# Patient Record
Sex: FEMALE | Race: WHITE | NOT HISPANIC OR LATINO | URBAN - METROPOLITAN AREA
[De-identification: names, ages, dates, MRNs, and addresses within clinical notes are randomized per-mention and may not be internally consistent; named-entity substitution may affect disease eponyms.]

---

## 2018-07-27 ENCOUNTER — HOSPITAL ENCOUNTER (EMERGENCY)
Facility: HOSPITAL | Age: 73
Discharge: HOME | End: 2018-07-27
Attending: FAMILY MEDICINE
Payer: MEDICARE

## 2018-07-27 VITALS
RESPIRATION RATE: 18 BRPM | OXYGEN SATURATION: 98 % | TEMPERATURE: 98 F | BODY MASS INDEX: 17.33 KG/M2 | SYSTOLIC BLOOD PRESSURE: 139 MMHG | WEIGHT: 104 LBS | HEIGHT: 65 IN | HEART RATE: 90 BPM | DIASTOLIC BLOOD PRESSURE: 75 MMHG

## 2018-07-27 DIAGNOSIS — N39.0 URINARY TRACT INFECTION WITHOUT HEMATURIA, SITE UNSPECIFIED: Primary | ICD-10-CM

## 2018-07-27 LAB
ALBUMIN SERPL BCP-MCNC: 4 G/DL
ALP SERPL-CCNC: 78 U/L
ALT SERPL W/O P-5'-P-CCNC: 25 U/L
ANION GAP SERPL CALC-SCNC: 8 MMOL/L
AST SERPL-CCNC: 27 U/L
BACTERIA #/AREA URNS HPF: ABNORMAL /HPF
BASOPHILS # BLD AUTO: 0.03 K/UL
BASOPHILS NFR BLD: 0.4 %
BILIRUB SERPL-MCNC: 0.1 MG/DL
BILIRUB UR QL STRIP: NEGATIVE
BUN SERPL-MCNC: 20 MG/DL
CALCIUM SERPL-MCNC: 9.7 MG/DL
CHLORIDE SERPL-SCNC: 101 MMOL/L
CLARITY UR: CLEAR
CO2 SERPL-SCNC: 29 MMOL/L
COLOR UR: YELLOW
CREAT SERPL-MCNC: 0.9 MG/DL
DIFFERENTIAL METHOD: NORMAL
EOSINOPHIL # BLD AUTO: 0.1 K/UL
EOSINOPHIL NFR BLD: 1.4 %
ERYTHROCYTE [DISTWIDTH] IN BLOOD BY AUTOMATED COUNT: 13.4 %
EST. GFR  (AFRICAN AMERICAN): >60 ML/MIN/1.73 M^2
EST. GFR  (NON AFRICAN AMERICAN): >60 ML/MIN/1.73 M^2
GLUCOSE SERPL-MCNC: 97 MG/DL
GLUCOSE UR QL STRIP: NEGATIVE
HCT VFR BLD AUTO: 39.2 %
HGB BLD-MCNC: 13.2 G/DL
HGB UR QL STRIP: NEGATIVE
HYALINE CASTS #/AREA URNS LPF: ABNORMAL /LPF
IMM GRANULOCYTES # BLD AUTO: 0.02 K/UL
IMM GRANULOCYTES NFR BLD AUTO: 0.2 %
KETONES UR QL STRIP: ABNORMAL
LEUKOCYTE ESTERASE UR QL STRIP: NEGATIVE
LYMPHOCYTES # BLD AUTO: 2.4 K/UL
LYMPHOCYTES NFR BLD: 28 %
MCH RBC QN AUTO: 31 PG
MCHC RBC AUTO-ENTMCNC: 33.7 G/DL
MCV RBC AUTO: 92 FL
MICROSCOPIC COMMENT: ABNORMAL
MONOCYTES # BLD AUTO: 0.7 K/UL
MONOCYTES NFR BLD: 8.3 %
NEUTROPHILS # BLD AUTO: 5.3 K/UL
NEUTROPHILS NFR BLD: 61.7 %
NITRITE UR QL STRIP: NEGATIVE
NRBC BLD-RTO: 0 /100 WBC
PH UR STRIP: 5 [PH] (ref 5–8)
PLATELET # BLD AUTO: 200 K/UL
PMV BLD AUTO: 10 FL
POTASSIUM SERPL-SCNC: 4.2 MMOL/L
PROT SERPL-MCNC: 7.2 G/DL
PROT UR QL STRIP: ABNORMAL
RBC # BLD AUTO: 4.26 M/UL
RBC #/AREA URNS HPF: 2 /HPF (ref 0–4)
SODIUM SERPL-SCNC: 138 MMOL/L
SP GR UR STRIP: >=1.03 (ref 1–1.03)
SQUAMOUS #/AREA URNS HPF: 6 /HPF
URN SPEC COLLECT METH UR: ABNORMAL
UROBILINOGEN UR STRIP-ACNC: NEGATIVE EU/DL
WBC # BLD AUTO: 8.57 K/UL
WBC #/AREA URNS HPF: 6 /HPF (ref 0–5)

## 2018-07-27 PROCEDURE — 74176 CT ABD & PELVIS W/O CONTRAST: CPT | Mod: TC

## 2018-07-27 PROCEDURE — 99284 EMERGENCY DEPT VISIT MOD MDM: CPT | Mod: 25

## 2018-07-27 PROCEDURE — 85025 COMPLETE CBC W/AUTO DIFF WBC: CPT

## 2018-07-27 PROCEDURE — 81000 URINALYSIS NONAUTO W/SCOPE: CPT

## 2018-07-27 PROCEDURE — 36415 COLL VENOUS BLD VENIPUNCTURE: CPT

## 2018-07-27 PROCEDURE — 80053 COMPREHEN METABOLIC PANEL: CPT

## 2018-07-27 PROCEDURE — 25000003 PHARM REV CODE 250: Performed by: FAMILY MEDICINE

## 2018-07-27 PROCEDURE — 74176 CT ABD & PELVIS W/O CONTRAST: CPT | Mod: 26,,, | Performed by: RADIOLOGY

## 2018-07-27 RX ORDER — NITROFURANTOIN (MACROCRYSTALS) 100 MG/1
100 CAPSULE ORAL EVERY 12 HOURS
Qty: 20 CAPSULE | Refills: 0 | Status: SHIPPED | OUTPATIENT
Start: 2018-07-27

## 2018-07-27 RX ORDER — OXYCODONE AND ACETAMINOPHEN 5; 325 MG/1; MG/1
1 TABLET ORAL
Status: COMPLETED | OUTPATIENT
Start: 2018-07-27 | End: 2018-07-27

## 2018-07-27 RX ORDER — NITROFURANTOIN 25; 75 MG/1; MG/1
100 CAPSULE ORAL
Status: COMPLETED | OUTPATIENT
Start: 2018-07-27 | End: 2018-07-27

## 2018-07-27 RX ORDER — OXYCODONE AND ACETAMINOPHEN 5; 325 MG/1; MG/1
1 TABLET ORAL EVERY 8 HOURS PRN
Qty: 12 TABLET | Refills: 0 | Status: SHIPPED | OUTPATIENT
Start: 2018-07-27

## 2018-07-27 RX ADMIN — OXYCODONE HYDROCHLORIDE AND ACETAMINOPHEN 1 TABLET: 5; 325 TABLET ORAL at 04:07

## 2018-07-27 RX ADMIN — NITROFURANTOIN (MONOHYDRATE/MACROCRYSTALS) 100 MG: 75; 25 CAPSULE ORAL at 04:07

## 2018-07-27 NOTE — ED PROVIDER NOTES
Encounter Date: 7/27/2018       History     Chief Complaint   Patient presents with    Flank Pain     rt sided pain x 4 days     73-year-old female presents complaining of pain to the right flank area she has had some urinary frequency she was treated for UTI approximately 2 weeks ago I thought it was over she denies any known history of urinary tract infections in the distant past, no history of nephrolithiasis, she denies any lifting prior problems with back pain but states she has been losing weight over the last 6 months and now weighs 110 lb, she does drink 1 Ensure daily as a supplement          Review of patient's allergies indicates:   Allergen Reactions    Codeine     Doxycycline      Past Medical History:   Diagnosis Date    COPD (chronic obstructive pulmonary disease)     GERD (gastroesophageal reflux disease)     Hypertension      Past Surgical History:   Procedure Laterality Date    APPENDECTOMY      BACK SURGERY      NECK SURGERY       History reviewed. No pertinent family history.  Social History   Substance Use Topics    Smoking status: Current Every Day Smoker     Packs/day: 0.50     Years: 55.00     Types: Cigarettes    Smokeless tobacco: Not on file    Alcohol use No     Review of Systems   Constitutional: Negative for fever.   HENT: Negative for sore throat.    Respiratory: Negative for shortness of breath.    Cardiovascular: Negative for chest pain.   Gastrointestinal: Negative for nausea.   Genitourinary: Negative for dysuria.   Musculoskeletal: Negative for back pain.   Skin: Negative for rash.   Neurological: Negative for weakness.   Hematological: Does not bruise/bleed easily.       Physical Exam     Initial Vitals   BP Pulse Resp Temp SpO2   07/27/18 1145 07/27/18 1145 07/27/18 1145 07/27/18 1147 07/27/18 1145   139/75 90 18 97.8 °F (36.6 °C) 98 %      MAP       --                Physical Exam    Nursing note and vitals reviewed.  Constitutional: She appears well-developed and  well-nourished. She is not diaphoretic. No distress.   HENT:   Head: Normocephalic and atraumatic.   Right Ear: External ear normal.   Left Ear: External ear normal.   Eyes: Pupils are equal, round, and reactive to light. Right eye exhibits no discharge. Left eye exhibits no discharge.   Neck: No tracheal deviation present. No JVD present.   Cardiovascular: Exam reveals no friction rub.    No murmur heard.  Pulmonary/Chest: No stridor. No respiratory distress. She has no wheezes. She has no rales.   Abdominal: Bowel sounds are normal. She exhibits no distension.   Genitourinary:   Genitourinary Comments: Positive right CVAT   Musculoskeletal: Normal range of motion.   Neurological: She is alert.   Skin: Skin is warm.   Psychiatric: She has a normal mood and affect.         ED Course   Procedures  Labs Reviewed   URINALYSIS, REFLEX TO URINE CULTURE - Abnormal; Notable for the following:        Result Value    Specific Gravity, UA >=1.030 (*)     Protein, UA 1+ (*)     Ketones, UA Trace (*)     All other components within normal limits    Narrative:     Preferred Collection Type->Urine, Clean Catch   URINALYSIS MICROSCOPIC - Abnormal; Notable for the following:     WBC, UA 6 (*)     Hyaline Casts, UA none seen (*)     All other components within normal limits    Narrative:     Preferred Collection Type->Urine, Clean Catch   CBC W/ AUTO DIFFERENTIAL   COMPREHENSIVE METABOLIC PANEL          Imaging Results          CT Abdomen Pelvis  Without Contrast (Final result)  Result time 07/27/18 14:45:34    Final result by Jalil Hawthorne MD (07/27/18 14:45:34)                 Impression:      1. Granulomatous change.  2. Increased colonic stool volume.  Correlate clinically for constipation.  3. Thoracolumbar levoscoliosis.  4. Bone demineralization.  Husam in the emergency room notified of the findings at 1440 hours 07/27/2018.      Electronically signed by: Jalil Hawthorne  Date:    07/27/2018  Time:    14:45              Narrative:    EXAMINATION:  CT ABDOMEN PELVIS WITHOUT CONTRAST    CLINICAL HISTORY:  Abdominal pain, unspecified;    TECHNIQUE:  Low dose axial images, sagittal and coronal reformations were obtained from the lung bases to the pubic symphysis.    COMPARISON:  None    FINDINGS:  Multiple small calcified granulomas at the right lung base.  The left lung base is clear.  No pleural or pericardial effusions.    The liver and spleen are normal in size and attenuation.  Granulomatous change within the spleen.  The gallbladder is distended without calcified gallstones.  Pancreas and adrenal glands are unremarkable.    Kidneys are normal in size and attenuation.  No renal calculi.  No perinephric inflammatory change.  Right greater than left extrarenal pelvises.    Large amount of stool throughout the colon and rectum.  No mesenteric inflammatory change.  No CT evidence for bowel obstruction.    The bladder is distended.  The uterus and ovaries are atrophic.  No free fluid within the dependent pelvis.    No significant mesenteric or retroperitoneal lymphadenopathy.  However, evaluation is limited secondary to the lack of intravenous contrast.    Moderate thoracolumbar levoscoliosis.  There is bone demineralization.  Moderate to severe degenerative lumbar spondylosis.                                                      Clinical Impression:   The encounter diagnosis was Urinary tract infection without hematuria, site unspecified.                             Dudley Sexton MD  07/27/18 1418

## 2024-11-19 ENCOUNTER — HOSPITAL ENCOUNTER (EMERGENCY)
Facility: HOSPITAL | Age: 79
Discharge: HOME | End: 2024-11-19
Attending: EMERGENCY MEDICINE
Payer: MEDICARE

## 2024-11-19 VITALS
BODY MASS INDEX: 16.66 KG/M2 | HEART RATE: 86 BPM | SYSTOLIC BLOOD PRESSURE: 141 MMHG | HEIGHT: 65 IN | TEMPERATURE: 98 F | OXYGEN SATURATION: 96 % | RESPIRATION RATE: 20 BRPM | DIASTOLIC BLOOD PRESSURE: 79 MMHG | WEIGHT: 100 LBS

## 2024-11-19 DIAGNOSIS — M54.50 CHRONIC BILATERAL LOW BACK PAIN, UNSPECIFIED WHETHER SCIATICA PRESENT: Primary | ICD-10-CM

## 2024-11-19 DIAGNOSIS — G89.29 CHRONIC BILATERAL LOW BACK PAIN, UNSPECIFIED WHETHER SCIATICA PRESENT: Primary | ICD-10-CM

## 2024-11-19 PROCEDURE — 96372 THER/PROPH/DIAG INJ SC/IM: CPT | Performed by: NURSE PRACTITIONER

## 2024-11-19 PROCEDURE — 99284 EMERGENCY DEPT VISIT MOD MDM: CPT | Mod: 25

## 2024-11-19 PROCEDURE — 63600175 PHARM REV CODE 636 W HCPCS: Performed by: NURSE PRACTITIONER

## 2024-11-19 RX ORDER — PREDNISONE 20 MG/1
TABLET ORAL
COMMUNITY
Start: 2024-09-03

## 2024-11-19 RX ORDER — TRAMADOL HYDROCHLORIDE 50 MG/1
50 TABLET ORAL EVERY 6 HOURS PRN
COMMUNITY
Start: 2024-06-05 | End: 2024-11-19 | Stop reason: ALTCHOICE

## 2024-11-19 RX ORDER — METOPROLOL TARTRATE 25 MG/1
12.5 TABLET, FILM COATED ORAL 2 TIMES DAILY
COMMUNITY
Start: 2024-10-14

## 2024-11-19 RX ORDER — OMEPRAZOLE 40 MG/1
40 CAPSULE, DELAYED RELEASE ORAL
COMMUNITY

## 2024-11-19 RX ORDER — HYDROCHLOROTHIAZIDE 25 MG/1
TABLET ORAL
COMMUNITY
Start: 2023-12-19

## 2024-11-19 RX ORDER — AZELASTINE 1 MG/ML
2 SPRAY, METERED NASAL
COMMUNITY
Start: 2024-07-16

## 2024-11-19 RX ORDER — DEXAMETHASONE SODIUM PHOSPHATE 10 MG/ML
10 INJECTION INTRAMUSCULAR; INTRAVENOUS
Status: COMPLETED | OUTPATIENT
Start: 2024-11-19 | End: 2024-11-19

## 2024-11-19 RX ORDER — DICLOFENAC SODIUM 10 MG/G
GEL TOPICAL
COMMUNITY
Start: 2024-04-17

## 2024-11-19 RX ORDER — ONDANSETRON 4 MG/1
4 TABLET, FILM COATED ORAL EVERY 8 HOURS PRN
COMMUNITY

## 2024-11-19 RX ORDER — TIZANIDINE 2 MG/1
TABLET ORAL
COMMUNITY
Start: 2024-09-03

## 2024-11-19 RX ORDER — LORAZEPAM 0.5 MG/1
TABLET ORAL
COMMUNITY

## 2024-11-19 RX ORDER — HYDROCODONE BITARTRATE AND ACETAMINOPHEN 5; 325 MG/1; MG/1
1 TABLET ORAL EVERY 8 HOURS PRN
Qty: 12 TABLET | Refills: 0 | Status: SHIPPED | OUTPATIENT
Start: 2024-11-19

## 2024-11-19 RX ORDER — HYDROCODONE BITARTRATE AND ACETAMINOPHEN 5; 325 MG/1; MG/1
TABLET ORAL
COMMUNITY
Start: 2024-10-23 | End: 2024-11-19

## 2024-11-19 RX ORDER — PRIMIDONE 250 MG/1
250 TABLET ORAL
COMMUNITY
Start: 2024-10-30

## 2024-11-19 RX ORDER — LOSARTAN POTASSIUM 50 MG/1
50 TABLET ORAL
COMMUNITY

## 2024-11-19 RX ORDER — BUPROPION HCL 150 MG
150 TABLET, EXTENDED RELEASE 24 HR ORAL
COMMUNITY
Start: 2024-06-11

## 2024-11-19 RX ORDER — UMECLIDINIUM BROMIDE AND VILANTEROL TRIFENATATE 62.5; 25 UG/1; UG/1
1 POWDER RESPIRATORY (INHALATION)
COMMUNITY
Start: 2024-11-02

## 2024-11-19 RX ORDER — DOCUSATE CALCIUM 240 MG
240 CAPSULE ORAL
COMMUNITY
Start: 2024-09-03

## 2024-11-19 RX ORDER — ALBUTEROL SULFATE 90 UG/1
2 INHALANT RESPIRATORY (INHALATION) EVERY 6 HOURS PRN
COMMUNITY
Start: 2024-07-16

## 2024-11-19 RX ADMIN — DEXAMETHASONE SODIUM PHOSPHATE 10 MG: 10 INJECTION INTRAMUSCULAR; INTRAVENOUS at 02:11

## 2024-11-25 ENCOUNTER — HOSPITAL ENCOUNTER (OUTPATIENT)
Dept: RADIOLOGY | Facility: HOSPITAL | Age: 79
Discharge: HOME | End: 2024-11-25
Attending: STUDENT IN AN ORGANIZED HEALTH CARE EDUCATION/TRAINING PROGRAM
Payer: MEDICARE

## 2024-11-25 ENCOUNTER — OFFICE VISIT (OUTPATIENT)
Dept: PAIN MEDICINE | Facility: CLINIC | Age: 79
End: 2024-11-25
Payer: MEDICARE

## 2024-11-25 VITALS
SYSTOLIC BLOOD PRESSURE: 128 MMHG | HEIGHT: 65 IN | DIASTOLIC BLOOD PRESSURE: 79 MMHG | BODY MASS INDEX: 17.33 KG/M2 | WEIGHT: 104 LBS | HEART RATE: 84 BPM

## 2024-11-25 DIAGNOSIS — M47.816 LUMBAR SPONDYLOSIS: Primary | ICD-10-CM

## 2024-11-25 DIAGNOSIS — G89.29 CHRONIC BILATERAL LOW BACK PAIN, UNSPECIFIED WHETHER SCIATICA PRESENT: ICD-10-CM

## 2024-11-25 DIAGNOSIS — M47.816 LUMBAR SPONDYLOSIS: ICD-10-CM

## 2024-11-25 DIAGNOSIS — M54.50 CHRONIC BILATERAL LOW BACK PAIN, UNSPECIFIED WHETHER SCIATICA PRESENT: ICD-10-CM

## 2024-11-25 PROBLEM — F41.1 GENERALIZED ANXIETY DISORDER: Status: ACTIVE | Noted: 2024-11-25

## 2024-11-25 PROBLEM — R11.0 CHRONIC NAUSEA: Status: ACTIVE | Noted: 2024-11-25

## 2024-11-25 PROBLEM — F51.01 PRIMARY INSOMNIA: Status: ACTIVE | Noted: 2024-11-25

## 2024-11-25 PROBLEM — Z98.1 HISTORY OF FUSION OF CERVICAL SPINE: Status: ACTIVE | Noted: 2024-11-25

## 2024-11-25 PROBLEM — I71.22 AORTIC ARCH PSEUDOANEURYSM: Status: ACTIVE | Noted: 2024-11-25

## 2024-11-25 PROBLEM — M41.9 SCOLIOSIS: Status: ACTIVE | Noted: 2024-11-25

## 2024-11-25 PROBLEM — I27.21 SECONDARY PULMONARY ARTERIAL HYPERTENSION: Status: ACTIVE | Noted: 2024-11-25

## 2024-11-25 PROBLEM — I10 ESSENTIAL HYPERTENSION: Status: ACTIVE | Noted: 2024-11-25

## 2024-11-25 PROBLEM — G30.1 ALZHEIMER'S DISEASE WITH LATE ONSET: Status: ACTIVE | Noted: 2024-11-25

## 2024-11-25 PROBLEM — Z98.890 HISTORY OF LUMBAR SURGERY: Status: ACTIVE | Noted: 2024-11-25

## 2024-11-25 PROBLEM — K21.9 GERD (GASTROESOPHAGEAL REFLUX DISEASE): Status: ACTIVE | Noted: 2024-11-25

## 2024-11-25 PROBLEM — R63.4 ABNORMAL WEIGHT LOSS: Status: ACTIVE | Noted: 2024-11-25

## 2024-11-25 PROBLEM — K59.09 CHRONIC CONSTIPATION: Status: ACTIVE | Noted: 2024-11-25

## 2024-11-25 PROBLEM — G47.34 NOCTURNAL HYPOXEMIA: Status: ACTIVE | Noted: 2024-11-25

## 2024-11-25 PROBLEM — Z95.0 PRESENCE OF LEADLESS CARDIAC PACEMAKER: Status: ACTIVE | Noted: 2024-11-25

## 2024-11-25 PROBLEM — R29.6 FALLS FREQUENTLY: Status: ACTIVE | Noted: 2024-11-25

## 2024-11-25 PROBLEM — Z79.899 ENCOUNTER FOR LONG TERM BENZODIAZEPINE THERAPY: Status: ACTIVE | Noted: 2024-11-25

## 2024-11-25 PROBLEM — F02.80 ALZHEIMER'S DISEASE WITH LATE ONSET: Status: ACTIVE | Noted: 2024-11-25

## 2024-11-25 PROBLEM — Z87.891 HISTORY OF SMOKING GREATER THAN 50 PACK YEARS: Status: ACTIVE | Noted: 2024-11-25

## 2024-11-25 PROBLEM — M54.12 CERVICAL RADICULOPATHY: Status: ACTIVE | Noted: 2024-11-25

## 2024-11-25 PROBLEM — M48.061 LUMBAR FORAMINAL STENOSIS: Status: ACTIVE | Noted: 2024-11-25

## 2024-11-25 PROBLEM — M89.9 DISORDER OF BONE: Status: ACTIVE | Noted: 2024-11-25

## 2024-11-25 PROBLEM — M54.16 LUMBAR RADICULOPATHY: Status: ACTIVE | Noted: 2024-11-25

## 2024-11-25 PROBLEM — Z87.898 HISTORY OF SYNCOPE: Status: ACTIVE | Noted: 2024-11-25

## 2024-11-25 PROBLEM — J98.4 APICAL LUNG SCARRING: Status: ACTIVE | Noted: 2024-11-25

## 2024-11-25 PROBLEM — J44.9 CHRONIC OBSTRUCTIVE PULMONARY DISEASE: Status: ACTIVE | Noted: 2024-11-25

## 2024-11-25 PROCEDURE — 99215 OFFICE O/P EST HI 40 MIN: CPT | Mod: PBBFAC,PN | Performed by: STUDENT IN AN ORGANIZED HEALTH CARE EDUCATION/TRAINING PROGRAM

## 2024-11-25 PROCEDURE — 72100 X-RAY EXAM L-S SPINE 2/3 VWS: CPT | Mod: 26,,, | Performed by: RADIOLOGY

## 2024-11-25 PROCEDURE — 99203 OFFICE O/P NEW LOW 30 MIN: CPT | Mod: S$PBB,,, | Performed by: STUDENT IN AN ORGANIZED HEALTH CARE EDUCATION/TRAINING PROGRAM

## 2024-11-25 PROCEDURE — 72100 X-RAY EXAM L-S SPINE 2/3 VWS: CPT | Mod: TC

## 2024-11-25 PROCEDURE — 99999 PR PBB SHADOW E&M-EST. PATIENT-LVL V: CPT | Mod: PBBFAC,,, | Performed by: STUDENT IN AN ORGANIZED HEALTH CARE EDUCATION/TRAINING PROGRAM
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
## 2024-11-29 NOTE — ED PROVIDER NOTES
Encounter Date: 11/19/2024       History     Chief Complaint   Patient presents with    Back Pain     Chronic low back pain that has gotten worse over the last month.Pt believes she aggravated pain in PT. Is supposed to get epidural injections but can't get scheduled until 3 weeks for now. Here for pain control.      79-year-old female, here complaining of chronic low back pain, worse over the last month.Pt believes she aggravated pain in PT. Is supposed to get epidural injections but can't get scheduled until 3 weeks for now. Here for pain control.        Review of patient's allergies indicates:   Allergen Reactions    Codeine Itching    Doxycycline Other (See Comments)    Amitriptyline      Other Reaction(s): migraine headache    Cyclobenzaprine      Other Reaction(s): brain fog     Past Medical History:   Diagnosis Date    COPD (chronic obstructive pulmonary disease)     GERD (gastroesophageal reflux disease)     Hypertension      Past Surgical History:   Procedure Laterality Date    APPENDECTOMY      BACK SURGERY      NECK SURGERY       No family history on file.  Social History     Tobacco Use    Smoking status: Every Day     Current packs/day: 0.50     Average packs/day: 0.5 packs/day for 55.0 years (27.5 ttl pk-yrs)     Types: Cigarettes   Substance Use Topics    Alcohol use: No    Drug use: No     Review of Systems   Musculoskeletal:  Positive for back pain. Negative for neck pain and neck stiffness.   All other systems reviewed and are negative.      Physical Exam     Initial Vitals [11/19/24 1307]   BP Pulse Resp Temp SpO2   (!) 141/79 86 20 98 °F (36.7 °C) 96 %      MAP       --         Physical Exam    Nursing note and vitals reviewed.  Constitutional: She appears well-developed and well-nourished. No distress.   HENT:   Head: Normocephalic and atraumatic.   Nose: Nose normal. Mouth/Throat: Oropharynx is clear and moist. No oropharyngeal exudate.   Eyes: Conjunctivae and EOM are normal. Pupils are equal,  round, and reactive to light. No scleral icterus.   Neck: Neck supple. No JVD present.   Normal range of motion.  Cardiovascular:  Normal rate, regular rhythm, normal heart sounds and intact distal pulses.           No murmur heard.  Pulmonary/Chest: Breath sounds normal. No stridor. No respiratory distress. She has no wheezes. She has no rhonchi. She has no rales.   Abdominal: Abdomen is soft. Bowel sounds are normal. She exhibits no distension. There is no abdominal tenderness.   Musculoskeletal:         General: Tenderness present. No edema. Normal range of motion.      Cervical back: Normal range of motion and neck supple.      Comments: Mild tenderness to palpation over the lower lumbar spine.  No bony abnormality, no erythema, no ecchymosis.  Normal motor strength in the legs.  No sensory deficits.     Neurological: She is alert and oriented to person, place, and time. She has normal strength. No cranial nerve deficit or sensory deficit. GCS score is 15. GCS eye subscore is 4. GCS verbal subscore is 5. GCS motor subscore is 6.   Skin: Skin is warm and dry. Capillary refill takes less than 2 seconds. No rash noted. No erythema.   Psychiatric: She has a normal mood and affect. Her behavior is normal.         ED Course   Procedures  Labs Reviewed - No data to display       Imaging Results    None       X-Rays:   Independently Interpreted Readings:   Other Readings:  X-ray lumbar spine personally reviewed by me shows chronic degenerative changes, surgical changes, no acute fracture    Medications   dexAMETHasone sodium phos (PF) injection 10 mg (10 mg Intramuscular Given 11/19/24 3834)     Medical Decision Making  Differential includes diskitis, abscess, chronic back pain, compression fracture, etc.    X-ray shows no acute fracture, chronic oral degenerative changes, chronic surgical changes.  Patient given Decadron injection here, and will be discharged home.  She will follow-up with her doctors, and return here  as needed or if worse in any way.    Risk  Prescription drug management.                                      Clinical Impression:  Final diagnoses:  [M54.50, G89.29] Chronic bilateral low back pain, unspecified whether sciatica present (Primary)          ED Disposition Condition    Discharge Stable          ED Prescriptions       Medication Sig Dispense Start Date End Date Auth. Provider    HYDROcodone-acetaminophen (NORCO) 5-325 mg per tablet Take 1 tablet by mouth every 8 (eight) hours as needed for Pain (may cause drowsiness). 12 tablet 11/19/2024 -- Campbell Wagner NP          Follow-up Information       Follow up With Specialties Details Why Contact Info Additional Information    [facility]   [address]  [facility] [address] [phone] Please use parking lot #3 and enter through entrance 3 for registration. 1st Floor             Trae Mosley MD  11/29/24 0597

## 2024-12-03 ENCOUNTER — TELEPHONE (OUTPATIENT)
Dept: PAIN MEDICINE | Facility: CLINIC | Age: 79
End: 2024-12-03
Payer: COMMERCIAL

## 2024-12-03 NOTE — TELEPHONE ENCOUNTER
----- Message from [employer] sent at 12/3/2024  9:39 AM CST -----  Contact: Tami [phone]  Type: Needs Medical Advice  Who Called:  Pts Hanhcarlos Garrett     Best Call Back Number: [phone]  Additional Information: [facility] did not  show up to pts house this morning and she is not sure how to contact them. Pls call back and advise

## 2024-12-03 NOTE — TELEPHONE ENCOUNTER
Do you happen to know what company this pt decided to use for home health PT? If not I will have to tell the niece it must be on her paperwork somewhere as we did not choose the company.

## 2024-12-06 ENCOUNTER — HOSPITAL ENCOUNTER (EMERGENCY)
Facility: HOSPITAL | Age: 79
Discharge: HOME | End: 2024-12-06
Attending: STUDENT IN AN ORGANIZED HEALTH CARE EDUCATION/TRAINING PROGRAM
Payer: MEDICARE

## 2024-12-06 VITALS
HEART RATE: 83 BPM | OXYGEN SATURATION: 95 % | HEIGHT: 64 IN | BODY MASS INDEX: 17.75 KG/M2 | DIASTOLIC BLOOD PRESSURE: 74 MMHG | TEMPERATURE: 98 F | RESPIRATION RATE: 17 BRPM | SYSTOLIC BLOOD PRESSURE: 148 MMHG | WEIGHT: 104 LBS
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
## 2024-12-06 DIAGNOSIS — S32.10XA CLOSED FRACTURE OF SACRUM, UNSPECIFIED PORTION OF SACRUM, INITIAL ENCOUNTER: ICD-10-CM

## 2024-12-06 DIAGNOSIS — S09.90XA INJURY OF HEAD, INITIAL ENCOUNTER: ICD-10-CM

## 2024-12-06 LAB
ANION GAP SERPL CALC-SCNC: 15 MMOL/L (ref 8–16)
BASOPHILS # BLD AUTO: 0.02 K/UL (ref 0–0.2)
BASOPHILS NFR BLD: 0.2 % (ref 0–1.9)
BUN SERPL-MCNC: 11 MG/DL (ref 8–23)
CALCIUM SERPL-MCNC: 9.2 MG/DL (ref 8.7–10.5)
CHLORIDE SERPL-SCNC: 97 MMOL/L (ref 95–110)
CO2 SERPL-SCNC: 24 MMOL/L (ref 23–29)
CREAT SERPL-MCNC: 0.7 MG/DL (ref 0.5–1.4)
DIFFERENTIAL METHOD BLD: ABNORMAL
EOSINOPHIL # BLD AUTO: 0 K/UL (ref 0–0.5)
EOSINOPHIL NFR BLD: 0.1 % (ref 0–8)
ERYTHROCYTE [DISTWIDTH] IN BLOOD BY AUTOMATED COUNT: 14.2 % (ref 11.5–14.5)
EST. GFR  (NO RACE VARIABLE): >60 ML/MIN/1.73 M^2
GLUCOSE SERPL-MCNC: 89 MG/DL (ref 70–110)
HCT VFR BLD AUTO: 40.7 % (ref 37–48.5)
HGB BLD-MCNC: 13.6 G/DL (ref 12–16)
IMM GRANULOCYTES # BLD AUTO: 0.03 K/UL (ref 0–0.04)
IMM GRANULOCYTES NFR BLD AUTO: 0.3 % (ref 0–0.5)
LYMPHOCYTES # BLD AUTO: 1 K/UL (ref 1–4.8)
LYMPHOCYTES NFR BLD: 11.1 % (ref 18–48)
MCH RBC QN AUTO: 30.6 PG (ref 27–31)
MCHC RBC AUTO-ENTMCNC: 33.4 G/DL (ref 32–36)
MCV RBC AUTO: 92 FL (ref 82–98)
MONOCYTES # BLD AUTO: 0.5 K/UL (ref 0.3–1)
MONOCYTES NFR BLD: 5.7 % (ref 4–15)
NEUTROPHILS # BLD AUTO: 7.1 K/UL (ref 1.8–7.7)
NEUTROPHILS NFR BLD: 82.6 % (ref 38–73)
NRBC BLD-RTO: 0 /100 WBC
OHS QRS DURATION: 78 MS
OHS QTC CALCULATION: 466 MS
PLATELET # BLD AUTO: 188 K/UL (ref 150–450)
PMV BLD AUTO: 9.8 FL (ref 9.2–12.9)
POTASSIUM SERPL-SCNC: 4.2 MMOL/L (ref 3.5–5.1)
RBC # BLD AUTO: 4.45 M/UL (ref 4–5.4)
SODIUM SERPL-SCNC: 136 MMOL/L (ref 136–145)
WBC # BLD AUTO: 8.62 K/UL (ref 3.9–12.7)

## 2024-12-06 PROCEDURE — 70450 CT HEAD/BRAIN W/O DYE: CPT | Mod: TC

## 2024-12-06 PROCEDURE — 85025 COMPLETE CBC W/AUTO DIFF WBC: CPT | Performed by: STUDENT IN AN ORGANIZED HEALTH CARE EDUCATION/TRAINING PROGRAM

## 2024-12-06 PROCEDURE — 93010 ELECTROCARDIOGRAM REPORT: CPT | Mod: ,,, | Performed by: INTERNAL MEDICINE

## 2024-12-06 PROCEDURE — 93005 ELECTROCARDIOGRAM TRACING: CPT

## 2024-12-06 PROCEDURE — 90471 IMMUNIZATION ADMIN: CPT | Performed by: STUDENT IN AN ORGANIZED HEALTH CARE EDUCATION/TRAINING PROGRAM

## 2024-12-06 PROCEDURE — 63600175 PHARM REV CODE 636 W HCPCS: Performed by: STUDENT IN AN ORGANIZED HEALTH CARE EDUCATION/TRAINING PROGRAM

## 2024-12-06 PROCEDURE — 72192 CT PELVIS W/O DYE: CPT | Mod: TC

## 2024-12-06 PROCEDURE — 99285 EMERGENCY DEPT VISIT HI MDM: CPT | Mod: 25

## 2024-12-06 PROCEDURE — 80048 BASIC METABOLIC PNL TOTAL CA: CPT | Performed by: STUDENT IN AN ORGANIZED HEALTH CARE EDUCATION/TRAINING PROGRAM

## 2024-12-06 PROCEDURE — 96374 THER/PROPH/DIAG INJ IV PUSH: CPT

## 2024-12-06 PROCEDURE — 90715 TDAP VACCINE 7 YRS/> IM: CPT | Performed by: STUDENT IN AN ORGANIZED HEALTH CARE EDUCATION/TRAINING PROGRAM

## 2024-12-06 RX ORDER — KETOROLAC TROMETHAMINE 30 MG/ML
15 INJECTION, SOLUTION INTRAMUSCULAR; INTRAVENOUS
Status: COMPLETED | OUTPATIENT
Start: 2024-12-06 | End: 2024-12-06

## 2024-12-06 RX ADMIN — KETOROLAC TROMETHAMINE 15 MG: 30 INJECTION, SOLUTION INTRAMUSCULAR; INTRAVENOUS at 08:12

## 2024-12-06 RX ADMIN — CLOSTRIDIUM TETANI TOXOID ANTIGEN (FORMALDEHYDE INACTIVATED), CORYNEBACTERIUM DIPHTHERIAE TOXOID ANTIGEN (FORMALDEHYDE INACTIVATED), BORDETELLA PERTUSSIS TOXOID ANTIGEN (GLUTARALDEHYDE INACTIVATED), BORDETELLA PERTUSSIS FILAMENTOUS HEMAGGLUTININ ANTIGEN (FORMALDEHYDE INACTIVATED), BORDETELLA PERTUSSIS PERTACTIN ANTIGEN, AND BORDETELLA PERTUSSIS FIMBRIAE 2/3 ANTIGEN 0.5 ML: 5; 2; 2.5; 5; 3; 5 INJECTION, SUSPENSION INTRAMUSCULAR at 08:12

## 2024-12-06 NOTE — ED PROVIDER NOTES
Encounter Date: 12/6/2024       History     Chief Complaint   Patient presents with    Fall     Patient woke up after falling into the shower and shattering the shower door.  Patient unable to recall why she fell.  Patient denies neck and back pain.  Patient complaint of coccyx pain.     The history is provided by the patient and the EMS personnel. No  was used.   Fall  The accident occurred several hours ago. The fall occurred while walking. She landed on A hard floor. The volume of blood lost was minimal. She was Ambulatory at the scene. There was No entrapment after the fall. There was No drug use involved in the accident. There was No alcohol use involved in the accident. Pertinent negatives include no neck pain, no back pain, no fever, no abdominal pain, no nausea and no vomiting. She has tried nothing for the symptoms. The treatment provided no relief.     Review of patient's allergies indicates:   Allergen Reactions    Codeine Itching    Doxycycline Other (See Comments)    Amitriptyline      Other Reaction(s): migraine headache    Cyclobenzaprine      Other Reaction(s): brain fog     Past Medical History:   Diagnosis Date    COPD (chronic obstructive pulmonary disease)     GERD (gastroesophageal reflux disease)     Hypertension      Past Surgical History:   Procedure Laterality Date    APPENDECTOMY      BACK SURGERY      NECK SURGERY       No family history on file.  Social History     Tobacco Use    Smoking status: Every Day     Current packs/day: 0.50     Average packs/day: 0.5 packs/day for 55.0 years (27.5 ttl pk-yrs)     Types: Cigarettes   Substance Use Topics    Alcohol use: No    Drug use: No     Review of Systems   Constitutional:  Negative for fatigue and fever.   HENT:  Negative for congestion and sore throat.    Respiratory:  Negative for cough and shortness of breath.    Cardiovascular:  Negative for chest pain and palpitations.   Gastrointestinal:  Negative for abdominal  pain, diarrhea, nausea and vomiting.   Genitourinary:  Negative for dysuria and pelvic pain.   Musculoskeletal:  Negative for back pain and neck pain.   Skin:  Negative for rash.   Neurological:  Negative for dizziness and weakness.   Hematological:  Does not bruise/bleed easily.       Physical Exam     Initial Vitals [12/06/24 0640]   BP Pulse Resp Temp SpO2   (!) 177/78 88 16 97.7 °F (36.5 °C) 96 %      MAP       --         Physical Exam    Constitutional: She appears well-developed and well-nourished. She is not diaphoretic. No distress.   HENT:   Head: Normocephalic and atraumatic.   Right Ear: External ear normal.   Left Ear: External ear normal.   Eyes: EOM are normal. Pupils are equal, round, and reactive to light. No scleral icterus.   Neck: Neck supple.   Normal range of motion.  Cardiovascular:  Normal rate, regular rhythm and normal heart sounds.           Pulmonary/Chest: Breath sounds normal. No stridor. No respiratory distress. She has no wheezes. She has no rales.   Abdominal: Abdomen is soft. She exhibits no distension. There is no abdominal tenderness.   Musculoskeletal:         General: No tenderness or edema. Normal range of motion.      Cervical back: Normal range of motion and neck supple.     Neurological: She is alert and oriented to person, place, and time. She has normal strength. No cranial nerve deficit. GCS score is 15. GCS eye subscore is 4. GCS verbal subscore is 5. GCS motor subscore is 6.   Steady gait.   Skin: Skin is warm and dry. Capillary refill takes less than 2 seconds. No pallor.   Multiple minor abrasions and superficial skin tears on upper extremities and elbows.   Psychiatric: She has a normal mood and affect.         ED Course   Procedures  Labs Reviewed   CBC W/ AUTO DIFFERENTIAL - Abnormal       Result Value    WBC 8.62      RBC 4.45      Hemoglobin 13.6      Hematocrit 40.7      MCV 92      MCH 30.6      MCHC 33.4      RDW 14.2      Platelets 188      MPV 9.8       Immature Granulocytes 0.3      Gran # (ANC) 7.1      Immature Grans (Abs) 0.03      Lymph # 1.0      Mono # 0.5      Eos # 0.0      Baso # 0.02      nRBC 0      Gran % 82.6 (*)     Lymph % 11.1 (*)     Mono % 5.7      Eosinophil % 0.1      Basophil % 0.2      Differential Method Automated     BASIC METABOLIC PANEL    Sodium 136      Potassium 4.2      Chloride 97      CO2 24      Glucose 89      BUN 11      Creatinine 0.7      Calcium 9.2      Anion Gap 15      eGFR >60.0       EKG Readings: (Independently Interpreted)   Rhythm: Normal Sinus Rhythm. Heart Rate: 85. Ectopy: No Ectopy. Conduction: Normal. ST Segments: Normal ST Segments. T Waves: Normal. Axis: Normal. Clinical Impression: Normal Sinus Rhythm     ECG Results              EKG 12-lead (In process)        Collection Time Result Time QRS Duration OHS QTC Calculation    12/06/24 07:03:51 12/06/24 07:22:30 78 466                     In process by Interface, Lab In [facility] (12/06/24 07:22:40)                   Narrative:    Test Reason : W19.XXXA,    Vent. Rate :  85 BPM     Atrial Rate :  85 BPM     P-R Int : 158 ms          QRS Dur :  78 ms      QT Int : 392 ms       P-R-T Axes :  86  -1  77 degrees    QTcB Int : 466 ms    Sinus rhythm with marked sinus arrythmia  Possible Left atrial enlargement  Anteroseptal infarct ,age undetermined  Abnormal ECG  No previous ECGs available    Referred By: AAAREFERRAL SELF           Confirmed By:                                   Imaging Results              CT Pelvis Without Contrast (Final result)  Result time 12/06/24 07:41:10      Final result by Laura Dean MD (12/06/24 07:41:10)                   Impression:      Distal sacral fracture, new since 2018 but otherwise age indeterminate.  Correlation with the site of patient pain and mechanism of injury is needed.    Degenerative changes at the hips with no evidence of a hip fracture.      Electronically signed by: Laura Nicholas  Dianne  Date:    12/06/2024  Time:    07:41               Narrative:    EXAMINATION:  CT PELVIS WITHOUT CONTRAST    CLINICAL HISTORY:  fall, hip pain;    TECHNIQUE:  CT of the pelvis was performed without contrast.  Multiplanar reformatted images were generated in bone windows    COMPARISON:  CT abdomen and pelvis 07/27/2018    FINDINGS:  There is no hematoma formation in the soft tissues of the pelvis.  No free fluid. Urinary bladder incidentally noted to be somewhat distended.  Extensive atherosclerosis is present.  The bones are demineralized.  There are degenerative changes at the pubic symphysis, hips, sacroiliac joints, also in the lower lumbar spine.  There is a slight irregularity in contour of the distal sacrum on the lateral view at the level of S5 which was not present on the 2018 CT.  This is best demonstrated on sagittal image 99 of series 5.  It has an appearance more consistent with an old injury than acute trauma, but correlation with the site of patient pain is needed.  No other potential sites of fracture identified.  Specifically, the hips are intact.                                       CT Head Without Contrast (Final result)  Result time 12/06/24 07:33:41      Final result by Laura Dean MD (12/06/24 07:33:41)                   Impression:      No acute abnormality.  Atrophy.  White matter disease likely on the basis of chronic microvascular ischemia.      Electronically signed by: Laura Dean  Date:    12/06/2024  Time:    07:33               Narrative:    EXAMINATION:  CT HEAD WITHOUT CONTRAST    CLINICAL HISTORY:  Fall, head injury;    TECHNIQUE:  Low dose axial images were obtained through the head.  Coronal and sagittal reformations were also performed. Contrast was not administered.    COMPARISON:  The report of an outside study from 2018    FINDINGS:  There is no intra or extra-axial hemorrhage.  No mass effect, edema or midline shift is present.  Generalized cerebral  and cerebellar atrophy is present along with moderate to marked periventricular low densities of chronic microvascular ischemia.    There is no skull fracture.  The visualized paranasal sinuses are clear.                                       Medications   Tdap vaccine injection 0.5 mL (0.5 mLs Intramuscular Given 12/6/24 0822)   ketorolac injection 15 mg (15 mg Intravenous Given 12/6/24 0827)     Medical Decision Making  79-year-old female the past medical history of dementia is presenting after a ground level fall.  States that she fell through her shower door last night but does not remember how or when.  She woke up this morning and called her daughter who called 911.  Currently her only complaint is pain in the back or hip.  She was ambulatory when EMS arrived and open the door for them.  She has multiple superficial abrasions.  No cervical or spinal tenderness.  No signs of significant head trauma.    Patient has a history of dementia and is at baseline.  Does not remember if she had any prodromal symptoms.  She was getting out of shower when she fell.  We will get imaging and basic labs.    CT head showed no acute abnormality.  Has chronic atrophy.  The patient did have a distal sacral fracture on CT of the pelvis.  Labs were overall unremarkable.  Discussed the case with Dr. Valero for Orthopedics who recommended pain control and outpatient follow up.  Her pain is well-controlled and she is ambulatory.  No indication for admission at this time.  Discharge with return precautions and instructed to follow up with the her primary care doctor.  Her daughter was at bedside.    Amount and/or Complexity of Data Reviewed  Labs: ordered. Decision-making details documented in ED Course.  Radiology: ordered and independent interpretation performed. Decision-making details documented in ED Course.  ECG/medicine tests: ordered and independent interpretation performed. Decision-making details documented in ED  Course.    Risk  Prescription drug management.                                      Clinical Impression:  Final diagnoses:  [W19.XXXA] Fall  [W19.XXXA] Fall, initial encounter (Primary)  [S09.90XA] Injury of head, initial encounter  [S32.10XA] Closed fracture of sacrum, unspecified portion of sacrum, initial encounter          ED Disposition Condition    Discharge Stable          ED Prescriptions    None       Follow-up Information       Follow up With Specialties Details Why Contact Info    [facility] Emergency Dept Emergency Medicine Schedule an appointment as soon as possible for a visit   [address] [place] [id number] [phone]             Michael Vasques MD  12/06/24 0904